# Patient Record
Sex: MALE | Race: OTHER | Employment: FULL TIME | URBAN - METROPOLITAN AREA
[De-identification: names, ages, dates, MRNs, and addresses within clinical notes are randomized per-mention and may not be internally consistent; named-entity substitution may affect disease eponyms.]

---

## 2019-07-30 ENCOUNTER — HOSPITAL ENCOUNTER (EMERGENCY)
Age: 44
Discharge: HOME | End: 2019-07-30
Attending: EMERGENCY MEDICINE

## 2019-07-30 ENCOUNTER — APPOINTMENT (OUTPATIENT)
Dept: GENERAL RADIOLOGY | Age: 44
End: 2019-07-30

## 2019-07-30 ENCOUNTER — APPOINTMENT (OUTPATIENT)
Dept: CT IMAGING | Age: 44
End: 2019-07-30

## 2019-07-30 VITALS
DIASTOLIC BLOOD PRESSURE: 72 MMHG | HEART RATE: 61 BPM | RESPIRATION RATE: 14 BRPM | OXYGEN SATURATION: 97 % | TEMPERATURE: 98.3 F | WEIGHT: 163.38 LBS | SYSTOLIC BLOOD PRESSURE: 113 MMHG

## 2019-07-30 DIAGNOSIS — R11.0 NAUSEA: ICD-10-CM

## 2019-07-30 DIAGNOSIS — R10.84 GENERALIZED ABDOMINAL PAIN: Primary | ICD-10-CM

## 2019-07-30 DIAGNOSIS — R91.1 PULMONARY NODULE: ICD-10-CM

## 2019-07-30 LAB
A/G RATIO: 1.2 (ref 1.1–2.2)
ALBUMIN SERPL-MCNC: 5.1 G/DL (ref 3.4–5)
ALP BLD-CCNC: 103 U/L (ref 40–129)
ALT SERPL-CCNC: 37 U/L (ref 10–40)
ANION GAP SERPL CALCULATED.3IONS-SCNC: 13 MMOL/L (ref 3–16)
AST SERPL-CCNC: 33 U/L (ref 15–37)
BASOPHILS ABSOLUTE: 0.1 K/UL (ref 0–0.2)
BASOPHILS RELATIVE PERCENT: 1.1 %
BILIRUB SERPL-MCNC: 0.5 MG/DL (ref 0–1)
BILIRUBIN URINE: NEGATIVE
BLOOD, URINE: NEGATIVE
BUN BLDV-MCNC: 17 MG/DL (ref 7–20)
CALCIUM SERPL-MCNC: 9.5 MG/DL (ref 8.3–10.6)
CHLORIDE BLD-SCNC: 97 MMOL/L (ref 99–110)
CLARITY: CLEAR
CO2: 25 MMOL/L (ref 21–32)
COLOR: YELLOW
CREAT SERPL-MCNC: 1 MG/DL (ref 0.9–1.3)
EKG ATRIAL RATE: 58 BPM
EKG DIAGNOSIS: NORMAL
EKG P AXIS: 61 DEGREES
EKG P-R INTERVAL: 116 MS
EKG Q-T INTERVAL: 424 MS
EKG QRS DURATION: 90 MS
EKG QTC CALCULATION (BAZETT): 416 MS
EKG R AXIS: -38 DEGREES
EKG T AXIS: 85 DEGREES
EKG VENTRICULAR RATE: 58 BPM
EOSINOPHILS ABSOLUTE: 0.1 K/UL (ref 0–0.6)
EOSINOPHILS RELATIVE PERCENT: 2.7 %
GFR AFRICAN AMERICAN: >60
GFR NON-AFRICAN AMERICAN: >60
GLOBULIN: 4.4 G/DL
GLUCOSE BLD-MCNC: 105 MG/DL (ref 70–99)
GLUCOSE URINE: NEGATIVE MG/DL
HCT VFR BLD CALC: 48.4 % (ref 40.5–52.5)
HEMOGLOBIN: 16.2 G/DL (ref 13.5–17.5)
KETONES, URINE: NEGATIVE MG/DL
LACTIC ACID: 1.2 MMOL/L (ref 0.4–2)
LEUKOCYTE ESTERASE, URINE: NEGATIVE
LIPASE: 29 U/L (ref 13–60)
LYMPHOCYTES ABSOLUTE: 1.7 K/UL (ref 1–5.1)
LYMPHOCYTES RELATIVE PERCENT: 31.4 %
MCH RBC QN AUTO: 29.1 PG (ref 26–34)
MCHC RBC AUTO-ENTMCNC: 33.5 G/DL (ref 31–36)
MCV RBC AUTO: 86.8 FL (ref 80–100)
MICROSCOPIC EXAMINATION: NORMAL
MONOCYTES ABSOLUTE: 0.5 K/UL (ref 0–1.3)
MONOCYTES RELATIVE PERCENT: 8.6 %
NEUTROPHILS ABSOLUTE: 3 K/UL (ref 1.7–7.7)
NEUTROPHILS RELATIVE PERCENT: 56.2 %
NITRITE, URINE: NEGATIVE
PDW BLD-RTO: 13.5 % (ref 12.4–15.4)
PH UA: 7 (ref 5–8)
PLATELET # BLD: 319 K/UL (ref 135–450)
PMV BLD AUTO: 8.6 FL (ref 5–10.5)
POTASSIUM SERPL-SCNC: 3.7 MMOL/L (ref 3.5–5.1)
PROTEIN UA: NEGATIVE MG/DL
RBC # BLD: 5.57 M/UL (ref 4.2–5.9)
SODIUM BLD-SCNC: 135 MMOL/L (ref 136–145)
SPECIFIC GRAVITY UA: 1.02 (ref 1–1.03)
TOTAL PROTEIN: 9.5 G/DL (ref 6.4–8.2)
TROPONIN: <0.01 NG/ML
URINE REFLEX TO CULTURE: NORMAL
URINE TYPE: NORMAL
UROBILINOGEN, URINE: 0.2 E.U./DL
WBC # BLD: 5.4 K/UL (ref 4–11)

## 2019-07-30 PROCEDURE — 83690 ASSAY OF LIPASE: CPT

## 2019-07-30 PROCEDURE — 93010 ELECTROCARDIOGRAM REPORT: CPT | Performed by: INTERNAL MEDICINE

## 2019-07-30 PROCEDURE — 74177 CT ABD & PELVIS W/CONTRAST: CPT

## 2019-07-30 PROCEDURE — 6360000002 HC RX W HCPCS: Performed by: PHYSICIAN ASSISTANT

## 2019-07-30 PROCEDURE — 93005 ELECTROCARDIOGRAM TRACING: CPT | Performed by: PHYSICIAN ASSISTANT

## 2019-07-30 PROCEDURE — 83605 ASSAY OF LACTIC ACID: CPT

## 2019-07-30 PROCEDURE — 96375 TX/PRO/DX INJ NEW DRUG ADDON: CPT

## 2019-07-30 PROCEDURE — 84484 ASSAY OF TROPONIN QUANT: CPT

## 2019-07-30 PROCEDURE — 85025 COMPLETE CBC W/AUTO DIFF WBC: CPT

## 2019-07-30 PROCEDURE — 96374 THER/PROPH/DIAG INJ IV PUSH: CPT

## 2019-07-30 PROCEDURE — 71046 X-RAY EXAM CHEST 2 VIEWS: CPT

## 2019-07-30 PROCEDURE — 6360000004 HC RX CONTRAST MEDICATION: Performed by: EMERGENCY MEDICINE

## 2019-07-30 PROCEDURE — 81003 URINALYSIS AUTO W/O SCOPE: CPT

## 2019-07-30 PROCEDURE — 96361 HYDRATE IV INFUSION ADD-ON: CPT

## 2019-07-30 PROCEDURE — 80053 COMPREHEN METABOLIC PANEL: CPT

## 2019-07-30 PROCEDURE — 2580000003 HC RX 258: Performed by: PHYSICIAN ASSISTANT

## 2019-07-30 PROCEDURE — 99284 EMERGENCY DEPT VISIT MOD MDM: CPT

## 2019-07-30 RX ORDER — MORPHINE SULFATE 4 MG/ML
4 INJECTION, SOLUTION INTRAMUSCULAR; INTRAVENOUS EVERY 30 MIN PRN
Status: DISCONTINUED | OUTPATIENT
Start: 2019-07-30 | End: 2019-07-30 | Stop reason: HOSPADM

## 2019-07-30 RX ORDER — 0.9 % SODIUM CHLORIDE 0.9 %
1000 INTRAVENOUS SOLUTION INTRAVENOUS ONCE
Status: COMPLETED | OUTPATIENT
Start: 2019-07-30 | End: 2019-07-30

## 2019-07-30 RX ORDER — ONDANSETRON 4 MG/1
4 TABLET, ORALLY DISINTEGRATING ORAL EVERY 8 HOURS PRN
Qty: 20 TABLET | Refills: 0 | Status: SHIPPED | OUTPATIENT
Start: 2019-07-30 | End: 2022-09-14

## 2019-07-30 RX ORDER — ONDANSETRON 2 MG/ML
4 INJECTION INTRAMUSCULAR; INTRAVENOUS ONCE
Status: COMPLETED | OUTPATIENT
Start: 2019-07-30 | End: 2019-07-30

## 2019-07-30 RX ORDER — FAMOTIDINE 20 MG/1
20 TABLET, FILM COATED ORAL 2 TIMES DAILY
Qty: 60 TABLET | Refills: 0 | Status: SHIPPED | OUTPATIENT
Start: 2019-07-30

## 2019-07-30 RX ADMIN — SODIUM CHLORIDE 1000 ML: 9 INJECTION, SOLUTION INTRAVENOUS at 14:24

## 2019-07-30 RX ADMIN — ONDANSETRON 4 MG: 2 INJECTION INTRAMUSCULAR; INTRAVENOUS at 14:24

## 2019-07-30 RX ADMIN — IOPAMIDOL 75 ML: 755 INJECTION, SOLUTION INTRAVENOUS at 15:12

## 2019-07-30 RX ADMIN — MORPHINE SULFATE 4 MG: 4 INJECTION INTRAVENOUS at 14:24

## 2019-07-30 ASSESSMENT — PAIN DESCRIPTION - PAIN TYPE: TYPE: ACUTE PAIN

## 2019-07-30 ASSESSMENT — ENCOUNTER SYMPTOMS
COUGH: 0
RHINORRHEA: 0
SHORTNESS OF BREATH: 0
ABDOMINAL PAIN: 1
VOMITING: 0
NAUSEA: 1
DIARRHEA: 0

## 2019-07-30 ASSESSMENT — PAIN SCALES - GENERAL
PAINLEVEL_OUTOF10: 6
PAINLEVEL_OUTOF10: 2
PAINLEVEL_OUTOF10: 2

## 2019-07-30 ASSESSMENT — PAIN DESCRIPTION - FREQUENCY: FREQUENCY: CONTINUOUS

## 2019-07-30 ASSESSMENT — PAIN DESCRIPTION - LOCATION: LOCATION: ABDOMEN

## 2019-07-30 ASSESSMENT — PAIN DESCRIPTION - ORIENTATION: ORIENTATION: LOWER

## 2019-07-30 ASSESSMENT — PAIN DESCRIPTION - DESCRIPTORS: DESCRIPTORS: CONSTANT

## 2019-07-30 NOTE — ED PROVIDER NOTES
[facility] Emergency Department      Pt Name: Tina Rod  MRN: [id number]  [place] 1975  Date of evaluation: 7/30/2019  Provider: Gema Paul MD  I independently performed a history and physical on Tina Rod. All diagnostic, treatment, and disposition decisions were made by myself in conjunction with the advanced practice provider. HPI: Tina Rod presented with   Chief Complaint   Patient presents with    Abdominal Pain     patient reports lower abdominal pain and nausea via  655203. x6 months but has been worsening. started only when he urinated but is now constant     [facility] Rober De Leon has no past medical history on file. He has no past surgical history on file. No current facility-administered medications on file prior to encounter. No current outpatient medications on file prior to encounter. PHYSICAL EXAM  Vitals: BP (!) 142/85   Pulse 72   Temp 98.2 °F (36.8 °C)   Resp 14   Wt 163 lb 6 oz (74.1 kg)   SpO2 98%   Constitutional:  38 y.o. male alert  HENT:  Atraumatic, oral mucosa moist  Neck:  No visible JVD, supple  Chest/Lungs:  Respiratory effort normal, clear, regular  Abdomen:  Non-distended, soft, NT  Back:  No gross deformity  Extremities:  Normal tone and perfusion    Medical Decision Making and Plan: Briefly, this is an 38 y.o.male who presented with abdominal pain, worsened with BM. Symptomatic for 6 months. The patient is feeling improvement after treatment. Based on history, physical exam, and testing our suspicion is low for bowel or biliary obstruction, cholangitis, perforated viscous, appendicitis, gonad torsion, vascular occlusion or dissection, mesenteric ischemia, ACS amongst other emergent conditions. Educated on pulmonary nodule. Krishna Pimentelwes De Leon was given appropriate discharge instructions. Referral to follow up provider.      For further details of individuals with minimal or absent history of   smoking and other known risk factors. - High risk patients include individuals with a history or smoking or known   risk factors. Radiology 2017 http://pubs. rsna.org/doi/full/10.1148/radiol. 5895809932         XR CHEST STANDARD (2 VW)   Final Result   Bibasilar hypoaeration. Otherwise, unremarkable appearing chest           EKG:  Read by me in the absence of a cardiologist shows:  SB, rate 58, incomplete RBBB, NSSTTWA, no prior EKG available    Medications administered:  Medications   morphine injection 4 mg (4 mg Intravenous Given 7/30/19 1424)   0.9 % sodium chloride bolus (0 mLs Intravenous Stopped 7/30/19 1507)   ondansetron (ZOFRAN) injection 4 mg (4 mg Intravenous Given 7/30/19 1424)   iopamidol (ISOVUE-370) 76 % injection 75 mL (75 mLs Intravenous Given 7/30/19 1512)     New Prescriptions    FAMOTIDINE (PEPCID) 20 MG TABLET    Take 1 tablet by mouth 2 times daily    ONDANSETRON (ZOFRAN ODT) 4 MG DISINTEGRATING TABLET    Take 1 tablet by mouth every 8 hours as needed for Nausea     FOLLOW UP:    [facility] Pre-Services  [phone]  Schedule an appointment as soon as possible for a visit in 2 days      [facility] Emergency Department  [address]  [address] [address]  [phone]    As needed, If symptoms worsen    FINAL IMPRESSION:    1. Generalized abdominal pain    2. Nausea    3.  Pulmonary nodule            Myke Thakkar MD  07/30/19 3339

## 2019-07-30 NOTE — ED PROVIDER NOTES
[address] [facility]        Pt Name: Geovani Gusman  MRN: [id number]  [place] 1975  Date of evaluation: 7/30/2019  Provider: Gio Giron PA-C  PCP: No primary care provider on file. This patient was seen and evaluated by the attending physician Giuseppe Serrano MD      [address] [facility]       Chief Complaint   Patient presents with    Abdominal Pain     patient reports lower abdominal pain and nausea via  [id number]. x6 months but has been worsening. started only when he urinated but is now constant       HISTORY OF PRESENT ILLNESS   (Location/Symptom, Timing/Onset, Context/Setting, Quality, Duration, Modifying Factors, Severity)  Note limiting factors. The patient's primary language is Kyrgyz, language lines are used for interpretation, interpretor number [id number]    [facility] Alexi Smith is a 38 y.o. male who presents to the emergency department today for evaluation for abdominal pain. The patient states that he has been experiencing generalized abdominal pain, and he states that it is mostly when he has to urinate or has a bowel movement, and he states that this is been ongoing for the past 6 months. The patient states that over the past couple of days his pain seems to have worsened, and he now has associated nausea. He states that he is having dry heaves but denies any vomiting. He denies any diarrhea. He denies any fever chills. He denies any isolated chest pain he feels that sometimes the pain will radiate into his chest.  He denies any shortness of breath. His pain is sharp, constant is rated as a 2/10. She states that he saw a doctor for this 6 months ago and he was told that he had allergies, he has been taking his medication without any difficulties. She denies any alcohol use, or tobacco use. No drug use.     Nursing Notes were all reviewed and agreed with or any disagreements were [facility]  [address] [facility],  [place], [address]   Phone [phone]   CBC WITH AUTO DIFFERENTIAL    Narrative:     Performed at:  [facility]  [address],  [place], [address]   Phone [phone]   LIPASE    Narrative:     Performed at:  [facility]  [address] [facility],  [place], [address]   Phone [phone]   URINE RT REFLEX TO CULTURE    Narrative:     Performed at:  [facility]  [address] [facility],  [place], [address]   Phone [phone]   TROPONIN    Narrative:     Performed at:  [facility]  [address] [facility],  [place], [address]   Phone [phone]   LACTIC ACID, PLASMA    Narrative:     Performed at:  [facility]  [address] [facility],  [place], [address]   Phone [phone]       All other labs were within normal range or not returned as of this dictation. EKG: All EKG's are interpreted by the Emergency Department Physician in the absence of a cardiologist.  Please see their note for interpretation of EKG. RADIOLOGY:   Non-plain film images such as CT, Ultrasound and MRI are read by the radiologist. [facility] radiographic images are visualized andpreliminarily interpreted by the  ED Provider with the below findings:        Interpretation perthe Radiologist below, if available at the time of this note:    CT ABDOMEN PELVIS W IV CONTRAST Additional Contrast? None   Preliminary Result   No acute intra-or intrapelvic abnormality noted on mildly motion limited   imaging. Incidental note of 4 mm noncalcified nodule right middle lobe. RECOMMENDATIONS:   Fleischner Society guidelines for follow-up and management of incidentally   detected pulmonary nodules:      Single Solid Nodule:      Nodule size less than 6 mm   In a low-risk patient, no routine follow-up.    In a high-risk patient, optional CT at 12 months.      -Low risk patients include individuals with minimal or absent history of   smoking and other known risk factors. - High risk patients include individuals with a history or smoking or known   risk factors. Radiology 2017 http://pubs. rsna.org/doi/full/10.1148/radiol. 3607420383         XR CHEST STANDARD (2 VW)   Final Result   Bibasilar hypoaeration. Otherwise, unremarkable appearing chest           No results found. PROCEDURES   Unless otherwise noted below, none     Procedures    CRITICAL CARE TIME   N/A    CONSULTS:  None      EMERGENCY DEPARTMENT COURSE and DIFFERENTIAL DIAGNOSIS/MDM:   Vitals:    Vitals:    07/30/19 1400 07/30/19 1415 07/30/19 1430 07/30/19 1500   BP: 111/83  (!) 106/59 113/72   Pulse:    61   Resp:    14   Temp:    98.3 °F (36.8 °C)   TempSrc:    Infrared   SpO2: 97% 99% 97% 97%   Weight:           Patient was given thefollowing medications:  Medications   morphine injection 4 mg (4 mg Intravenous Given 7/30/19 1424)   0.9 % sodium chloride bolus (0 mLs Intravenous Stopped 7/30/19 1507)   ondansetron (ZOFRAN) injection 4 mg (4 mg Intravenous Given 7/30/19 1424)   iopamidol (ISOVUE-370) 76 % injection 75 mL (75 mLs Intravenous Given 7/30/19 1512)     The patient who presents to the emergency department today for evaluation for abdominal pain. The patient states that he has been experiencing generalized abdominal pain, and he states that it is mostly when he has to urinate or has a bowel movement, and he states that this is been ongoing for the past 6 months. The patient states that over the past couple of days his pain seems to have worsened, and he now has associated nausea. He states that he is having dry heaves but denies any vomiting. He denies any diarrhea. He denies any fever chills. He denies any isolated chest pain he feels that sometimes the pain will radiate into his chest.  He denies any shortness of breath. His pain is sharp, constant is rated as a 2/10.   She states DISINTEGRATING TABLET    Take 1 tablet by mouth every 8 hours as needed for Nausea       DISCONTINUED MEDICATIONS:  Discontinued Medications    No medications on file              (Please note that portions ofthis note were completed with a voice recognition program.  Efforts were made to edit the dictations but occasionally words are mis-transcribed.)    Sunni Gonzalez PA-C (electronically signed)           Sunni Gonzalez PA-C  07/30/19 4982

## 2022-09-14 ENCOUNTER — HOSPITAL ENCOUNTER (EMERGENCY)
Age: 47
Discharge: HOME | End: 2022-09-14
Attending: EMERGENCY MEDICINE

## 2022-09-14 VITALS
RESPIRATION RATE: 17 BRPM | WEIGHT: 184.3 LBS | OXYGEN SATURATION: 100 % | DIASTOLIC BLOOD PRESSURE: 96 MMHG | TEMPERATURE: 98.1 F | SYSTOLIC BLOOD PRESSURE: 139 MMHG | HEART RATE: 64 BPM

## 2022-09-14 DIAGNOSIS — K52.9 GASTROENTERITIS: Primary | ICD-10-CM

## 2022-09-14 LAB
ALBUMIN SERPL-MCNC: 4.6 G/DL (ref 3.4–5)
ALP BLD-CCNC: 118 U/L (ref 40–129)
ALT SERPL-CCNC: 88 U/L (ref 10–40)
ANION GAP SERPL CALCULATED.3IONS-SCNC: 13 MMOL/L (ref 3–16)
AST SERPL-CCNC: 61 U/L (ref 15–37)
BASE EXCESS VENOUS: 0.3 MMOL/L (ref -2–3)
BASOPHILS ABSOLUTE: 0 K/UL (ref 0–0.2)
BASOPHILS RELATIVE PERCENT: 0.8 %
BILIRUB SERPL-MCNC: 0.3 MG/DL (ref 0–1)
BILIRUBIN DIRECT: <0.2 MG/DL (ref 0–0.3)
BILIRUBIN, INDIRECT: ABNORMAL MG/DL (ref 0–1)
BUN BLDV-MCNC: 14 MG/DL (ref 7–20)
CALCIUM SERPL-MCNC: 9.2 MG/DL (ref 8.3–10.6)
CARBOXYHEMOGLOBIN: 0.4 % (ref 0–1.5)
CHLORIDE BLD-SCNC: 101 MMOL/L (ref 99–110)
CO2: 22 MMOL/L (ref 21–32)
CREAT SERPL-MCNC: 0.8 MG/DL (ref 0.9–1.3)
EOSINOPHILS ABSOLUTE: 0.2 K/UL (ref 0–0.6)
EOSINOPHILS RELATIVE PERCENT: 3.8 %
GFR AFRICAN AMERICAN: >60
GFR NON-AFRICAN AMERICAN: >60
GLUCOSE BLD-MCNC: 98 MG/DL (ref 70–99)
HCO3 VENOUS: 25.2 MMOL/L (ref 24–28)
HCT VFR BLD CALC: 43.5 % (ref 40.5–52.5)
HEMOGLOBIN, VEN, REDUCED: 12.5 %
HEMOGLOBIN: 14.8 G/DL (ref 13.5–17.5)
LACTIC ACID: 1 MMOL/L (ref 0.4–2)
LIPASE: 30 U/L (ref 13–60)
LYMPHOCYTES ABSOLUTE: 2 K/UL (ref 1–5.1)
LYMPHOCYTES RELATIVE PERCENT: 38.9 %
MCH RBC QN AUTO: 29.6 PG (ref 26–34)
MCHC RBC AUTO-ENTMCNC: 34 G/DL (ref 31–36)
MCV RBC AUTO: 87.1 FL (ref 80–100)
METHEMOGLOBIN VENOUS: 0 % (ref 0–1.5)
MONOCYTES ABSOLUTE: 0.7 K/UL (ref 0–1.3)
MONOCYTES RELATIVE PERCENT: 14 %
NEUTROPHILS ABSOLUTE: 2.2 K/UL (ref 1.7–7.7)
NEUTROPHILS RELATIVE PERCENT: 42.5 %
O2 SAT, VEN: 88 %
PCO2, VEN: 40.4 MMHG (ref 41–51)
PDW BLD-RTO: 13.8 % (ref 12.4–15.4)
PH VENOUS: 7.4 (ref 7.35–7.45)
PLATELET # BLD: 274 K/UL (ref 135–450)
PMV BLD AUTO: 8.4 FL (ref 5–10.5)
PO2, VEN: 54.6 MMHG (ref 25–40)
POTASSIUM REFLEX MAGNESIUM: 4.1 MMOL/L (ref 3.5–5.1)
RBC # BLD: 4.99 M/UL (ref 4.2–5.9)
SODIUM BLD-SCNC: 136 MMOL/L (ref 136–145)
TCO2 CALC VENOUS: 26 MMOL/L
TOTAL PROTEIN: 7.9 G/DL (ref 6.4–8.2)
WBC # BLD: 5.2 K/UL (ref 4–11)

## 2022-09-14 PROCEDURE — 36415 COLL VENOUS BLD VENIPUNCTURE: CPT

## 2022-09-14 PROCEDURE — 83605 ASSAY OF LACTIC ACID: CPT

## 2022-09-14 PROCEDURE — 99284 EMERGENCY DEPT VISIT MOD MDM: CPT

## 2022-09-14 PROCEDURE — 2580000003 HC RX 258: Performed by: EMERGENCY MEDICINE

## 2022-09-14 PROCEDURE — 85025 COMPLETE CBC W/AUTO DIFF WBC: CPT

## 2022-09-14 PROCEDURE — 82803 BLOOD GASES ANY COMBINATION: CPT

## 2022-09-14 PROCEDURE — 96375 TX/PRO/DX INJ NEW DRUG ADDON: CPT

## 2022-09-14 PROCEDURE — 83690 ASSAY OF LIPASE: CPT

## 2022-09-14 PROCEDURE — 80048 BASIC METABOLIC PNL TOTAL CA: CPT

## 2022-09-14 PROCEDURE — 6360000002 HC RX W HCPCS: Performed by: EMERGENCY MEDICINE

## 2022-09-14 PROCEDURE — 96374 THER/PROPH/DIAG INJ IV PUSH: CPT

## 2022-09-14 PROCEDURE — 80076 HEPATIC FUNCTION PANEL: CPT

## 2022-09-14 RX ORDER — MORPHINE SULFATE 4 MG/ML
4 INJECTION, SOLUTION INTRAMUSCULAR; INTRAVENOUS ONCE
Status: COMPLETED | OUTPATIENT
Start: 2022-09-14 | End: 2022-09-14

## 2022-09-14 RX ORDER — ONDANSETRON 2 MG/ML
4 INJECTION INTRAMUSCULAR; INTRAVENOUS ONCE
Status: COMPLETED | OUTPATIENT
Start: 2022-09-14 | End: 2022-09-14

## 2022-09-14 RX ORDER — SODIUM CHLORIDE, SODIUM LACTATE, POTASSIUM CHLORIDE, AND CALCIUM CHLORIDE .6; .31; .03; .02 G/100ML; G/100ML; G/100ML; G/100ML
1000 INJECTION, SOLUTION INTRAVENOUS ONCE
Status: COMPLETED | OUTPATIENT
Start: 2022-09-14 | End: 2022-09-14

## 2022-09-14 RX ORDER — ONDANSETRON 4 MG/1
4 TABLET, ORALLY DISINTEGRATING ORAL EVERY 8 HOURS PRN
Qty: 20 TABLET | Refills: 0 | Status: SHIPPED | OUTPATIENT
Start: 2022-09-14

## 2022-09-14 RX ADMIN — ONDANSETRON 4 MG: 2 INJECTION INTRAMUSCULAR; INTRAVENOUS at 19:17

## 2022-09-14 RX ADMIN — MORPHINE SULFATE 4 MG: 4 INJECTION, SOLUTION INTRAMUSCULAR; INTRAVENOUS at 19:16

## 2022-09-14 RX ADMIN — SODIUM CHLORIDE, SODIUM LACTATE, POTASSIUM CHLORIDE, AND CALCIUM CHLORIDE 1000 ML: .6; .31; .03; .02 INJECTION, SOLUTION INTRAVENOUS at 19:16

## 2022-09-14 ASSESSMENT — ENCOUNTER SYMPTOMS
RESPIRATORY NEGATIVE: 1
SHORTNESS OF BREATH: 0
BACK PAIN: 1
SORE THROAT: 0
EYES NEGATIVE: 1
NAUSEA: 1
ABDOMINAL DISTENTION: 1
BLOOD IN STOOL: 0
RHINORRHEA: 0
ABDOMINAL PAIN: 1
VOMITING: 0
DIARRHEA: 1
COUGH: 0

## 2022-09-14 ASSESSMENT — PAIN - FUNCTIONAL ASSESSMENT: PAIN_FUNCTIONAL_ASSESSMENT: 0-10

## 2022-09-14 ASSESSMENT — PAIN DESCRIPTION - LOCATION
LOCATION: ABDOMEN
LOCATION: ABDOMEN

## 2022-09-14 ASSESSMENT — PAIN DESCRIPTION - PAIN TYPE: TYPE: ACUTE PAIN

## 2022-09-14 ASSESSMENT — PAIN SCALES - GENERAL
PAINLEVEL_OUTOF10: 6
PAINLEVEL_OUTOF10: 2

## 2022-09-14 ASSESSMENT — PAIN DESCRIPTION - DESCRIPTORS: DESCRIPTORS: CRAMPING

## 2022-09-15 NOTE — DISCHARGE INSTRUCTIONS
Cuando regrese a casa, comience con sunita dieta de líquidos tim, santino se describe a continuación. Cuando haya podido tolerar sunita dieta de líquidos tim nishi 6 a 12 horas sin vomitar, puede progresar a sunita [place]ak [place] y blanda. Cuando haya podido tolerar la dieta suave y [place] 12 a 24 horas sin vómitos ni diarrea, puede progresar a sunita dieta normal. [place] zofran según sea necesario para las náuseas y los vómitos. Llame al consultorio de flores médico para programar sunita lore de seguimiento. Llame a flores médico o regrese al departamento de emergencias si tiene síntomas que empeoran, particularmente vómitos persistentes con incapacidad para retener líquidos o medicamentos, fiebre superior a 101 °F, dolor abdominal intenso u otros síntomas preocupantes.

## 2022-10-03 NOTE — ED PROVIDER NOTES
[address] [facility]          ATTENDING PHYSICIAN NOTE       Date of evaluation: 9/14/2022    Chief Complaint     Abdominal Pain (On going since Sunday, n/v/d and generalized abdominal cramping. Denies fever/chills but states will eat a small amount and then have to use the restroom. )      History of Present Illness     Krishna Lowry is a 49 y.o. male, generally healthy to his knowledge, who presents to the emergency department with complaints of a 3-day history of nausea without vomiting, upper abdominal pain, and frequent diarrhea. Patient states that he was feeling well on Saturday, September 10. Over the course of the day on Sunday, September 11, he first began to notice upper abdominal discomfort that he describes as a pressure-like feeling, and a sense of nausea, with a feeling that whenever he pressed on the area he might vomit. He has been severely nauseated since that time, although he states he has not vomited. Sunday evening he began to experience significant diarrhea, and states that since that time he has had perhaps 12-15 episodes of watery diarrhea per day. He has not noted any blood in his stool. He describes his abdominal pain as being 6 out of 10 in intensity, a pressure-like or cramping sensation that is primarily in the upper abdomen. He does not feel that his abdominal discomfort is changed with bowel movements. He feels that his abdomen is more protuberant now than usual.  Patient states that he has been able to tolerate some p.o. intake, but states that his hoot is he eats anything he immediately had an episode of diarrhea, and also feels quite nauseous and distended. He feels that he is dehydrated, and sometimes feels lightheaded when he stands up. Today he has noticed some cramping pain in his bilateral low back and bilateral thighs. He denies any fevers or chills.   He denies any URI symptoms or cough, chest pain or shortness of Discussed results of biopsy done 09/16/2022 with patient:    Pathologic Diagnosis   Skin, right helix, shave biopsy:   -Basal cell carcinoma, predominantly nodular type      Electronically signed by Arsenio Davila MD on 9/21/2022 at 0926     Discussed ED+C vs Mohs surgery     Will treat with ED+C - 15 minute procedure appointment.   in NAD. HEENT: Pupils are equal, round, and reactive to light. Extraocular muscles are intact. Conjunctivae are clear and moist. No redness or drainage from the eyes. Neck: Supple, with full range of motion. Back: No CVA tenderness. No midline T or L spine tenderness to palpation, crepitus, or step-offs. Cardiovascular: Normal S1-S2 without murmur rub or gallop. 2+ radial pulses bilaterally. Respiratory: Unlabored breathing with equal chest rise and fall. Lungs are clear to auscultation bilaterally. No adventitious lung sounds heard. Abdomen: Protuberant, but soft and nondistended. There is discomfort to palpation in the epigastric region, which the patient indicates causes him worsening nausea, without guarding or rebound tenderness. No masses or hepatosplenomegaly. Hyperactive bowel sounds throughout. Skin: Warm and dry, without rashes or ecchymoses, lacerations or abrasions. Neuro: Alert and oriented x3. No focal neurologic deficits are noted. Extremities: Warm and well-perfused, without clubbing, cyanosis, or edema. The patient moves all extremities equally. Psych: The patient's mood and affect are generally within normal limits for their presentation.       Diagnostic Results       RADIOLOGY:  No orders to display       LABS:   Results for orders placed or performed during the hospital encounter of 09/14/22   CBC with Auto Differential   Result Value Ref Range    WBC 5.2 4.0 - 11.0 K/uL    RBC 4.99 4.20 - 5.90 M/uL    Hemoglobin 14.8 13.5 - 17.5 g/dL    Hematocrit 43.5 40.5 - 52.5 %    MCV 87.1 80.0 - 100.0 fL    MCH 29.6 26.0 - 34.0 pg    MCHC 34.0 31.0 - 36.0 g/dL    RDW 13.8 12.4 - 15.4 %    Platelets 841 256 - 825 K/uL    MPV 8.4 5.0 - 10.5 fL    Neutrophils % 42.5 %    Lymphocytes % 38.9 %    Monocytes % 14.0 %    Eosinophils % 3.8 %    Basophils % 0.8 %    Neutrophils Absolute 2.2 1.7 - 7.7 K/uL    Lymphocytes Absolute 2.0 1.0 - 5.1 K/uL    Monocytes Absolute 0.7 0.0 - 1.3 K/uL    Eosinophils Absolute 0.2 0.0 - 0.6 K/uL    Basophils Absolute 0.0 0.0 - 0.2 K/uL   Basic Metabolic Panel w/ Reflex to MG   Result Value Ref Range    Sodium 136 136 - 145 mmol/L    Potassium reflex Magnesium 4.1 3.5 - 5.1 mmol/L    Chloride 101 99 - 110 mmol/L    CO2 22 21 - 32 mmol/L    Anion Gap 13 3 - 16    Glucose 98 70 - 99 mg/dL    BUN 14 7 - 20 mg/dL    Creatinine 0.8 (L) 0.9 - 1.3 mg/dL    GFR Non-African American >60 >60    GFR African American >60 >60    Calcium 9.2 8.3 - 10.6 mg/dL   Hepatic Function Panel   Result Value Ref Range    Total Protein 7.9 6.4 - 8.2 g/dL    Albumin 4.6 3.4 - 5.0 g/dL    Alkaline Phosphatase 118 40 - 129 U/L    ALT 88 (H) 10 - 40 U/L    AST 61 (H) 15 - 37 U/L    Total Bilirubin 0.3 0.0 - 1.0 mg/dL    Bilirubin, Direct <0.2 0.0 - 0.3 mg/dL    Bilirubin, Indirect see below 0.0 - 1.0 mg/dL   Lipase   Result Value Ref Range    Lipase 30.0 13.0 - 60.0 U/L   Blood gas, venous (Lab)   Result Value Ref Range    pH, Srini 7.403 7.350 - 7.450    pCO2, Srini 40.4 (L) 41.0 - 51.0 mmHg    pO2, Srini 54.6 (H) 25.0 - 40.0 mmHg    HCO3, Venous 25.2 24.0 - 28.0 mmol/L    Base Excess, Srini 0.3 -2.0 - 3.0 mmol/L    O2 Sat, Srini 88 Not established %    Carboxyhemoglobin 0.4 0.0 - 1.5 %    MetHgb, Srini 0.0 0.0 - 1.5 %    TC02 (Calc), Srini 26 mmol/L    Hemoglobin, Srini, Reduced 12.50 %   Lactic Acid   Result Value Ref Range    Lactic Acid 1.0 0.4 - 2.0 mmol/L       RECENT VITALS:  BP: (!) 139/96, Temp: 98.1 °F (36.7 °C), Heart Rate: 64, Resp: 17, SpO2: 100 %     Procedures       ED Course     Nursing Notes, Past Medical Hx, Past Surgical Hx, Social Hx, Allergies, and Family Hx were reviewed.     The patient was given the following medications:  Orders Placed This Encounter   Medications    lactated ringers bolus    ondansetron (ZOFRAN) injection 4 mg    morphine injection 4 mg    ondansetron (ZOFRAN ODT) 4 MG disintegrating tablet     Sig: Place 1 tablet under the tongue every 8 hours as needed for Nausea     Dispense:  20 tablet     Refill:  0       CONSULTS:  None    MEDICAL DECISION MAKING / ASSESSMENT / Nicholas Lieberman is a 49 y.o. male, generally healthy, who presents to the emergency department with a 3-day history of nausea and poor p.o. intake without vomiting, upper abdominal discomfort and intermittent pain, and profuse diarrhea, without a clear inciting factor. His abdomen is somewhat protuberant, but overall soft. He does not have tenderness in the epigastric region so much as worsening of his nausea when the area is palpated. However, after his initial evaluation cross-sectional imaging was initially ordered, in part due to the reported severity of the patient's symptoms, and in part due to greater caution relating to the patient's language barrier. He was given fluids, Zofran, and 4 mg of morphine. Laboratory evaluation is entirely unremarkable and reassuring. On reexamination, the patient stated that his symptoms had an essentially entirely resolved, and he was eager for discharge. At that point, the CT had not yet been performed, and was not felt to be necessary. The patient was in agreement with this plan. His overall presentation is consistent with a gastroenteritis. The patient was discharged with a prescription for Zofran, and instructions on starting with a clear liquid diet, and transitioning to a soft bland diet, then back to a normal diet as his symptoms improve. He does not have a primary care provider, so was given a referral and contact information for the [facility]. medical clinic to establish primary care if desired. He was given return precautions for any worsening symptoms or other concerns. Clinical Impression     1.  Gastroenteritis        Disposition     PATIENT REFERRED TO:  [address] [place] [address] [phone]    Schedule an appointment as soon as possible for a visit   for follow-up and re-evaluation    DISCHARGE MEDICATIONS:  Discharge Medication List as of 9/14/2022  9:03 PM          DISPOSITION Decision To Discharge    (Please note that portions of this note were completed with voice recognition software.   Efforts were made to edit the dictations but occasionally words are mis-transcribed.)     Soraya Lancaster MD  09/14/22 0424

## 2022-10-07 ENCOUNTER — TELEPHONE (OUTPATIENT)
Dept: INTERNAL MEDICINE CLINIC | Age: 47
End: 2022-10-07